# Patient Record
Sex: FEMALE | Race: AMERICAN INDIAN OR ALASKA NATIVE | URBAN - METROPOLITAN AREA
[De-identification: names, ages, dates, MRNs, and addresses within clinical notes are randomized per-mention and may not be internally consistent; named-entity substitution may affect disease eponyms.]

---

## 2017-03-02 ENCOUNTER — TELEPHONE (OUTPATIENT)
Dept: OPHTHALMOLOGY | Facility: CLINIC | Age: 52
End: 2017-03-02

## 2017-03-02 NOTE — TELEPHONE ENCOUNTER
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  S/p one day post-op November 10th  Pt states has not seen eye doctor here or locally-- lives 6 hours away  States tarsorrhaphy came out about one week after surgery in noveber 2016  No bandage contact lens for long period per pt    Pt states more right eye mattering for days ago  Yesterday eye pain with movement on and off  Concerned about eye problem  Pt lives 6 hours away-- encouraged seeing local eye doctor first today for eval  Pt has direct phone number triage-- if after 4:30 and unable to contact triage to call main number and have eye doctor on call paged for emergency after hours  Pan Pina RN 10:31 AM 03/02/17

## 2017-03-02 NOTE — TELEPHONE ENCOUNTER
----- Message from Pan Pina RN sent at 3/2/2017  7:36 AM CST -----  Regarding: FW: Poss Ruptured Eye - Dr Cao  Contact: [phone]  No answer and unable leave voicemail-- not set up  Pan Pina RN 7:37 AM 03/02/17    ----- Message -----     From: Lubna Main     Sent: 3/1/2017   5:16 PM       To: Eye T[facility]  Subject: Poss Ruptured Eye - Dr Cao                       The pt is having issues with her eye again, and thinks that it has ruptured again. It happened about 6 hours ago.  Please call her to discuss coming in to see Dr Cao. She can be here in about 5 hours. She can be reached at [phone]    Thanks Lien lopez    Please DO NOT send this message and/or reply back to sender.  Call Center Representatives DO NOT respond to messages.

## 2017-03-08 ENCOUNTER — OFFICE VISIT (OUTPATIENT)
Dept: OPHTHALMOLOGY | Facility: CLINIC | Age: 52
End: 2017-03-08
Attending: OPHTHALMOLOGY
Payer: MEDICAID

## 2017-03-08 DIAGNOSIS — Z94.7 HISTORY OF PENETRATING KERATOPLASTY: ICD-10-CM

## 2017-03-08 DIAGNOSIS — T86.8419 FAILED CORNEAL TRANSPLANT: Primary | ICD-10-CM

## 2017-03-08 PROCEDURE — 99212 OFFICE O/P EST SF 10 MIN: CPT | Mod: ZF

## 2017-03-08 ASSESSMENT — PACHYMETRY
OD_CT(UM): UNABLE
OS_CT(UM): 541

## 2017-03-08 ASSESSMENT — CONF VISUAL FIELD
OD_SUPERIOR_TEMPORAL_RESTRICTION: 1
OD_INFERIOR_TEMPORAL_RESTRICTION: 1
OD_SUPERIOR_NASAL_RESTRICTION: 1
OD_INFERIOR_NASAL_RESTRICTION: 1
OS_NORMAL: 1

## 2017-03-08 ASSESSMENT — SLIT LAMP EXAM - LIDS: COMMENTS: NORMAL

## 2017-03-08 ASSESSMENT — TONOMETRY
IOP_METHOD: TONOPEN
OD_IOP_MMHG: 15
OS_IOP_MMHG: 13

## 2017-03-08 ASSESSMENT — VISUAL ACUITY
OS_PH_SC: 20/50
OD_SC: HM
OS_SC: 20/100
METHOD: SNELLEN - LINEAR

## 2017-03-08 NOTE — NURSING NOTE
Chief Complaints and History of Present Illnesses   Patient presents with     Follow Up For     therapeutic PKP/AMT/tarsorrhaphy OD (11/9/16).      HPI    Affected eye(s):  Right   Symptoms:     (Comment: RE)   (Comment: RE)   Floaters   No flashes   No glare   No halos   (Comment: RE)   No tearing   No itching   No photophobia   (Comment: LE in last month)      Duration:  1 month   Frequency:  Intermittent       Do you have eye pain now?:  No      Comments:  NO Pain for last month-- have been getting some pain in RE for last 2 days  NO Flashing  NO Floaters  NO trauma since last visit 11/2016    Pred Acetate  Vigamox  OIntment    Yudy LAST 5:45 PM March 8, 2017

## 2017-03-08 NOTE — MR AVS SNAPSHOT
After Visit Summary   3/8/2017    Kelli Gusman    MRN: [id number]           Patient Information     Date Of Birth          1965        Visit Information        Provider Department      3/8/2017 5:15 PM [facility]rendan Cao MD [facility]        Today's Diagnoses     Failed corneal transplant    -  1    History of penetrating keratoplasty           Follow-ups after your visit        Who to contact     Please call your clinic at [phone] to:    Ask questions about your health    Make or cancel appointments    Discuss your medicines    Learn about your test results    Speak to your doctor   If you have compliments or concerns about an experience at your clinic, or if you wish to file a complaint, please contact [facility] Patient Relations at [phone] or email us at [email or web address]         Additional Information About Your Visit        MyChart Information     [employer] is an electronic gateway that provides easy, online access to your medical records. With [employer], you can request a clinic appointment, read your test results, renew a prescription or communicate with your care team.     To sign up for [employer] visit the website at [email or web address]   You will be asked to enter the access code listed below, as well as some personal information. Please follow the directions to create your username and password.     Your access code is: [email or web address]  Expires: 2017  9:30 AM     Your access code will  in 90 days. If you need help or a new code, please contact your [facility] or call [phone] for assistance.        Care EveryWhere ID     This is your Care EveryWhere ID. This could be used by other organizations to access your [facility] medical records  [id number]         Blood Pressure from Last 3 Encounters:   16 106/79   16 (!) 131/100    Weight from Last 3 Encounters:   16 63.5 kg  (140 lb)   07/29/16 69.4 kg (153 lb)              Today, you had the following     No orders found for display       Primary Care Provider Office Phone # Fax #    Umberto Guzmán [phone]       [place] [address] [address] [facility] [address]        Thank you!     Thank you for choosing EYE CLINIC  for your care. Our goal is always to provide you with excellent care. Hearing back from our patients is one way we can continue to improve our services. Please take a few minutes to complete the written survey that you may receive in the mail after your visit with us. Thank you!             Your Updated Medication List - Protect others around you: Learn how to safely use, store and throw away your medicines at www.disposemymeds.org.          This list is accurate as of: 3/8/17 11:59 PM.  Always use your most recent med list.                   Brand Name Dispense Instructions for use    acyclovir 400 MG tablet    ZOVIRAX     TAKE 1 TABLET BY MOUTH 5 TIMES DAILY       ascorbic acid 500 MG tablet    VITAMIN C    30 tablet    Take 2 tablets (1,000 mg) by mouth 2 times daily       ATIVAN PO          clonazePAM 1 MG tablet    klonoPIN     Take 1 mg by mouth       * GABAPENTIN PO      Take 300 mg by mouth 3 times daily       * gabapentin 300 MG capsule    NEURONTIN     Take 600 mg by mouth       IBUPROFEN PO      Take 600 mg by mouth 3 times daily       MIRAPEX PO          ofloxacin 0.3 % ophthalmic solution    OCUFLOX     Apply 1 drop to eye       oxyCODONE-acetaminophen 5-325 MG per tablet    PERCOCET     Take 1 tablet by mouth       prednisoLONE acetate 1 % ophthalmic susp    PRED FORTE     Apply 1 drop to eye       prednisoLONE sodium phosphate 1 % ophthalmic solution    INFLAMASE FORTE     Place 1 drop into the right eye 4 times daily       TRAMADOL HCL PO      Take 50 mg by mouth every 6 hours as needed for moderate to severe pain       XANAX PO          zolpidem 10 MG tablet    AMBIEN     TK 1 T PO  QHS       * Notice:  This list has 2 medication(s) that are the same as other medications prescribed for you. Read the directions carefully, and ask your doctor or other care provider to review them with you.

## 2017-03-09 NOTE — PROGRESS NOTES
CC: Right eye pain and decreased vision    52 yo female status post therapeutic PKP x 2 for corneal perforation right eye (last 11/9/16), lost to follow up. Patient presents as an emergent walk-in for pain, redness, tearing, blurry vision OD. Pt apparently fell asleep in the waiting room.   She was recently hospitalized, and missed post op appointments.    A/P:    Failed penetrating keratoplasty right eye.  Multiple loose sutures  All sutures loose and removed; ofloxacin instilled afterwards  Prednisolone four times a day right eye  Vigamox four times a day right eye  Okay to continue antibiotic ointment at night right eye  Will wait for eye to quiet down before considering surgical options (high risk given failed graft x 2, lack of compliance and follow up)  F/u ~ 1 month    ~~~~~~~~~~~~~~~~~~~~~~~~~~~~~~~~~~~~~~~~~~~~~~~~~~~~~~~~~~~~~~~~    Complete documentation of historical and exam elements from today's encounter can be found in the full encounter summary report (not reduplicated in this progress note). I personally obtained the chief complaint(s) and history of present illness.  I confirmed and edited as necessary the review of systems, past medical/surgical history, family history, social history, and examination findings as documented by others; and I examined the patient myself. I personally reviewed the relevant tests, images, and reports as documented above. I formulated and edited as necessary the assessment and plan and discussed the findings and management plan with the patient and family.    Brendan Cao MD

## 2018-03-07 ENCOUNTER — HOSPITAL ENCOUNTER (EMERGENCY)
Facility: CLINIC | Age: 53
Discharge: HOME | End: 2018-03-07

## 2018-03-07 VITALS
HEIGHT: 68 IN | RESPIRATION RATE: 17 BRPM | OXYGEN SATURATION: 100 % | BODY MASS INDEX: 26.83 KG/M2 | TEMPERATURE: 97.2 F | WEIGHT: 177 LBS | DIASTOLIC BLOOD PRESSURE: 76 MMHG | SYSTOLIC BLOOD PRESSURE: 109 MMHG

## 2018-03-08 NOTE — ED NOTES
Patient walked out ED lobby. She's been saying she needs to see her son who's admitted in one of the unit here at [facility]. Left with out signing Declination of Medical Screening Exam.